# Patient Record
Sex: MALE | Race: BLACK OR AFRICAN AMERICAN | NOT HISPANIC OR LATINO | Employment: FULL TIME | URBAN - METROPOLITAN AREA
[De-identification: names, ages, dates, MRNs, and addresses within clinical notes are randomized per-mention and may not be internally consistent; named-entity substitution may affect disease eponyms.]

---

## 2022-07-26 ENCOUNTER — HOSPITAL ENCOUNTER (EMERGENCY)
Facility: HOSPITAL | Age: 57
Discharge: HOME | End: 2022-07-26
Attending: EMERGENCY MEDICINE | Admitting: EMERGENCY MEDICINE

## 2022-07-26 ENCOUNTER — APPOINTMENT (OUTPATIENT)
Dept: CT IMAGING | Facility: HOSPITAL | Age: 57
End: 2022-07-26

## 2022-07-26 VITALS
RESPIRATION RATE: 16 BRPM | TEMPERATURE: 97.8 F | OXYGEN SATURATION: 92 % | SYSTOLIC BLOOD PRESSURE: 128 MMHG | DIASTOLIC BLOOD PRESSURE: 84 MMHG | HEART RATE: 66 BPM

## 2022-07-26 DIAGNOSIS — M54.50 LUMBAR PAIN: Primary | ICD-10-CM

## 2022-07-26 PROCEDURE — 72131 CT LUMBAR SPINE W/O DYE: CPT

## 2022-07-26 PROCEDURE — 99283 EMERGENCY DEPT VISIT LOW MDM: CPT

## 2022-07-26 PROCEDURE — 74176 CT ABD & PELVIS W/O CONTRAST: CPT

## 2022-07-26 RX ORDER — ACETAMINOPHEN 500 MG
1000 TABLET ORAL ONCE
Status: COMPLETED | OUTPATIENT
Start: 2022-07-26 | End: 2022-07-26

## 2022-07-26 RX ORDER — IBUPROFEN 400 MG/1
400 TABLET ORAL ONCE
Status: COMPLETED | OUTPATIENT
Start: 2022-07-26 | End: 2022-07-26

## 2022-07-26 RX ADMIN — ACETAMINOPHEN 1000 MG: 500 TABLET ORAL at 10:51

## 2022-07-26 RX ADMIN — IBUPROFEN 400 MG: 400 TABLET, FILM COATED ORAL at 10:49

## 2022-07-26 NOTE — DISCHARGE INSTRUCTIONS
As we discussed, return if you have any fever, weakness in lower extremities, any urinary or fecal incontinence or retention, any numbness or tingling in the groin region or between your buttocks.  Call the numbers provided to establish a primary care provider and a spine doctor.  Take Tylenol or Motrin for pain.

## 2022-07-26 NOTE — ED PROVIDER NOTES
EMERGENCY DEPARTMENT ENCOUNTER    Room Number:  35/35  Date of encounter:  7/26/2022  PCP: Provider, No Known  Historian: Patient      HPI:  Chief Complaints back pain  A complete HPI/ROS/PMH/PSH/SH/FH are unobtainable due to: Patient speaks Panamanian and history was obtained via   Context: Antonio Wong is a 56 y.o. male who presents to the ED c/o back pain that is been occurring over 3 years.  Pain is in his lower back in his lumbar region.  Pain occurs more in the morning and is worse when he goes to get up out of bed and tries to initially start to walk.  Still is present episodically throughout the day but is better.  It is worse when he rolls over to his left or right side or when he goes to sit up.  He is seen a doctor at [facility] who is giving him oral medicine for pain that is not a narcotic.  This was 1 or 2 years ago.  He is no longer seeing that doctor.  He denies any urinary or fecal incontinence or retention, fever, history of malignancy, any weakness to his lower extremity.  Denies any numbness or tingling or any saddle anesthesia.  He currently does not have a primary care doctor and has never seen a specialist for this.  He also has no radiation of his symptoms to the leg.  He denies any abdominal pain, chest pain, shortness of breath.  A friend told him to come to the emergency department that we would take care of his back pain here in the emergency department.        Previous Episodes: Yes for over 3 years  Current Symptoms: Currently doing better than what he was in the morning.    MEDICAL HISTORY REVIEWED  I see patient had x-rays at [facility] in 2019 of the lumbar spine no fracture no gross significant abnormality.  The radiology report was reviewed.      PAST MEDICAL HISTORY  Active Ambulatory Problems     Diagnosis Date Noted   • No Active Ambulatory Problems     Resolved Ambulatory Problems     Diagnosis Date Noted   • No Resolved Ambulatory Problems     No Additional Past  Medical History         PAST SURGICAL HISTORY  No past surgical history on file.      FAMILY HISTORY  No family history on file.      SOCIAL HISTORY  Social History     Socioeconomic History   • Marital status:          ALLERGIES  Patient has no known allergies.        REVIEW OF SYSTEMS  Review of Systems     All systems reviewed and negative except for those discussed in HPI.       PHYSICAL EXAM    I have reviewed the triage vital signs and nursing notes.    ED Triage Vitals [07/26/22 0832]   Temp Heart Rate Resp BP SpO2   97.8 °F (36.6 °C) 90 16 -- 92 %      Temp src Heart Rate Source Patient Position BP Location FiO2 (%)   Tympanic Monitor -- -- --       GENERAL: Male no acute distress.Vital signs on my initial evaluation unremarkable.  He is resting comfortably.  O2 sat on my evaluation is 98% on room air.  HENT: nares patent  Head/neck/ face are symmetric without gross deformity, signs of trauma, or swelling  EYES: no scleral icterus, no conjunctival pallor.  NECK: Supple, no meningismus  CV: regular rhythm, regular rate with intact distal pulses.  RESPIRATORY: normal effort and no respiratory distress.  ABDOMEN: soft and nontender.  Scar on the right lower quadrant from previous appendectomy.  Back exam his location of pain is in his back diffusely.  Normal inspection.  He has normal saddle sensation and normal rectal tone.  His pain is reproducible when I have him rollover or have him sit up.  He is able to get up and stand and ambulate.  His pain is reproducible when he goes from a lying to a sitting and sitting to standing position.  MUSCULOSKELETAL: no deformity.5/5 strength to hip flexion, knee extension/flexion, dorsiflexion, plantarflexion, and EHL.  No pain with bilateral external and internal rotation of hips.  Intact distal pulses with no cyanosis, pallor, or temperature change on palpation. Normal inspection and palpation with soft compartments.  SILT at bilateral superficial peroneal, deep  peroneal, sural, and saphenous nerves  Negative straight leg bilaterally  NEURO: alert and appropriate, moves all extremities, follows commands.  No focal motor or sensory changes  SKIN: warm, dry    Vital signs and nursing notes reviewed.        LAB RESULTS  No results found for this or any previous visit (from the past 24 hour(s)).    Ordered the above labs and independently reviewed the results.        RADIOLOGY  CT Abdomen Pelvis Without Contrast    Result Date: 7/26/2022  CT ABDOMEN AND PELVIS WITHOUT IV CONTRAST  HISTORY: 56-year-old with back pain x2-3 years. No known injury.  TECHNIQUE: Radiation dose reduction techniques were utilized, including automated exposure control and exposure modulation based on body size. 3 mm images were obtained through the abdomen and pelvis without the administration of IV contrast. Lack of IV contrast limits evaluation of solid, visceral, and vascular structures. Sensitivity for underlying lesions and infection decreased.  COMPARISON: None.  FINDINGS: Evaluation is limited by significant motion.  LOWER CHEST: Dependent bibasilar atelectasis. The heart size is within normal limits.  ABDOMEN: Liver/Biliary Tract: Within normal limits.  Spleen: Within normal limits.  Pancreas: Within normal limits.  Adrenals: Within normal limits.  Kidneys:  No radiopaque obstructing stones or hydronephrosis.  Bowel:  Small hiatal hernia with thickening of the distal esophagus. The stomach is incompletely distended. No obstruction. The appendix is not definitively visualized with extensive motion artifact particularly in the right lower quadrant. No definitive acute inflammatory changes.  Peritoneum: No free fluid or free air.  Vasculature:    Scattered calcific atherosclerosis.  Lymph Nodes:  Within normal limits.                             PELVIS:                                 Pelvic organs: Diffuse thickening of the urinary bladder, query mild perivesical stranding versus motion. Prostate  in situ. No free fluid.  Abdominal/Pelvic Wall: Within normal limits.  BONES: Multilevel degenerative changes with probable Schmorl's node L2 vertebral body. Please refer to the separate dictated report of the lumbar spine for further detail.      1. No definitive acute intra-abdominal or pelvic process on this exam significantly limited by motion. The appendix is not definitively visualized, however there are no acute inflammatory changes in the right lower quadrant. If there is persistent clinical concern, recommend repeat motion-free imaging. 2. Diffuse thickening of the urinary bladder, please correlate for cystitis. 3. Degenerative changes lumbosacral spine and pelvis. Please refer to the separate dictated report of the spine for further detail. 4. Please see above for additional findings.      CT Lumbar Spine Without Contrast    Result Date: 7/26/2022  CT LUMBAR SPINE WITHOUT CONTRAST  HISTORY: Back pain.  COMPARISON: None.  FINDINGS: The alignment of the lumbar spine is within normal limits. A moderate-to-large Schmorl's node involving the superior endplate of L2 is appreciated measuring approximately 11 mm in size. Smaller Schmorl's nodes involving the inferior lateral aspect of L5 to the left and the superior endplate of L4 are noted.  L1-L2: There is no evidence of disc bulge or herniation.  L2-L3: There is no evidence of disc bulge or herniation.  L3-L4: A mild central disc osteophyte complex is present which results in mild flattening of the ventral surface of the thecal sac. It contributes to mild foraminal stenosis bilaterally.  L4-L5: A broad-based disc osteophyte complex is present which is more prominent to the left. This results in mild-to-moderate central canal stenosis, mild lateral recess narrowing on the right and moderate lateral recess narrowing on the left. Moderate foraminal stenosis is present bilaterally, slightly more prominent on the left secondary to extension of a disc osteophyte  complex into the neural foramen.  L5-S1: A broad-based disc osteophyte complex is present which is slightly more prominent to the left. This abuts and mildly displaces the left traversing S1 nerve root. Mild foraminal stenosis is present bilaterally secondary to extension of a disc osteophyte complex into the neural foramen.      Multilevel degenerative disease involving the lumbar spine as described above including multiple Schmorl's nodes (largest involving the superior endplate of L2 where it measures approximately 10-11 mm in size), broad-based disc osteophyte complexes and multilevel foraminal stenosis. Foraminal stenosis is most prominent at L4-L5 where it is estimated to be moderate due to extension of a disc osteophyte complex into the neural foramen. The disc osteophyte complex at L4-L5 is more prominent to the left and results in mild-to-moderate canal stenosis, mild lateral recess narrowing on the right and moderate lateral recess narrowing on the left. A smaller disc osteophyte complex is present at L5-S1, also more prominent to the left where it abuts and mildly displaces the left S1 nerve root. Clinical correlation is recommended. Further evaluation could be performed with an MRI examination of the lumbar spine.    Radiation dose reduction techniques were utilized, including automated exposure control and exposure modulation based on body size.         I ordered the above noted radiological studies. Reviewed by me and discussed with radiologist.  See dictation for official radiology interpretation.      PROCEDURES    Procedures      MEDICATIONS GIVEN IN ER    Medications   acetaminophen (TYLENOL) tablet 1,000 mg (1,000 mg Oral Given 7/26/22 1051)   ibuprofen (ADVIL,MOTRIN) tablet 400 mg (400 mg Oral Given 7/26/22 1049)         PROGRESS, DATA ANALYSIS, CONSULTS, AND MEDICAL DECISION MAKING    I informed patient of the test that we will order.  I will CT his abdomen and pelvis.  He does not want anything  for pain at this time.  Answered all questions at this time.  His history and exam are not concerning for any emergent cause of his back pain.  No signs of infection or cauda equina syndrome or any infection.  We are currently under a pandemic from the COVID19 infection.  The patient presented to the emergency department by ambulance or personal vehicle. I followed the current protocols required by Infection Control at [facility] in my evaluation and treatment of the patient. The patient was wearing a face mask during my evaluation and throughout my encounter. During my whole encounter with this patient I used appropriate personal protective equipment.  This equipment consisted of eye protection, facemask, gown, and gloves.  I applied this equipment before entering the room.      All labs have been independently reviewed by me.  All radiology studies have been reviewed by me and discussed with radiologist dictating the report.   EKG's independently viewed and interpreted by me.  Discussion below represents my analysis of pertinent findings related to patient's condition, differential diagnosis, treatment plan and final disposition.      ED Course as of 07/26/22 1552   Tue Jul 26, 2022   0841 I was informed by the triage nurse that this patient speaks Salvadorean.  She obtained history via  and she reports that this patient has had back pain for over 3 years. [MM]   1214 I have tried to use  iPad.  I requested a Salvadorean  and they are not able to access 1 at this time.  Instructed the nurse and she has been try to get access to a Salvadorean . [MM]   1129 CT scan of the abdomen pelvis report was reviewed no definitive acute intra-abdominal or pelvic process identified.  Patient is status post appendectomy.  Some thickening of the urinary bladder.  I reviewed the radiologist report.  Please see complete data report from radiologist. [MM]   1129 In reviewing the CT scan of  the lumbar spine he has multilevel degenerative disease involving the lumbar spine please see the dictated report from radiologist I reviewed the report nothing that is emergent only concerning on the CT scan report. [MM]   1230 In attempting the history from the .  He has no warning signs of any serious cause of back pain.  This is been occurring for several years.  It is worse with movement and especially worse in the mornings.  He has no urinary or fecal incontinence or retention, fever, history of active cancer, weakness to his lower extremity, has no paresthesias or numbness including no saddle anesthesia.  His pain does not radiate to his lower extremities.  On my exam he is able to stand and ambulate.  Appears to have some mild discomfort when he goes from a lying position to a sitting and standing position.  I conveyed to him on my initial encounter with him my concerns that this is likely musculoskeletal in origin and the test that we will order.  I also explained to him that I will give him a spine doctor and a primary care doctor to follow-up with, assuming the CT scans do not show any emergent condition.  I would be surprised if the CT scan of the abdomen pelvis as well as a CT scan of the lumbar spine would show anything that is emergent that we would need to act on immediately in the emergency department. [MM]   1241 I communicated via  with the patient and informed the results of the CT scans.  And I did clarify that this is not an emergent condition and gave him warning signs to return.  I also will give him a primary care provider number to call as well as a spine doctor.  All questions were answered. [MM]      ED Course User Index  [MM] Tello Villafuerte MD       AS OF 15:52 EDT VITALS:    BP - 128/84  HR - 66  TEMP - 97.8 °F (36.6 °C) (Tympanic)  02 SATS - 92%        DIAGNOSIS  Final diagnoses:   Lumbar pain: Musculoskeletal lumbar pain         DISPOSITION  DISCHARGE    Patient  discharged in stable condition.    Reviewed implications of results, diagnosis, meds, responsibility to follow up, warning signs and symptoms of possible worsening, potential complications and reasons to return to ER, including worsening of symptoms, worsening of pain, fever, urinary or fecal incontinence or retention, any weakness in lower extremities, or any concerns..    Patient/Family voiced understanding of above instructions.    Discussed plan for discharge, as there is no emergent indication for admission. Pt/family is agreeable and understands need for follow up and repeat testing.  Pt is aware that discharge does not mean that nothing is wrong but it indicates no emergency is present that requires admission and they must continue care with follow-up as given below or physician of their choice.     FOLLOW-UP  PATIENT CONNECTION - [place] [address]  [phone]    Call today when you get home or tomorrow to arrange follow-up with a new primary care provider.  Return if any weakness in lower extremities, urinary or fecal incontinence or retention, any numbness or tingling in your bottom or groin, fever, any concern    Michael Casarez MD  [address] [place] [address]  [phone]      Call today or tomorrow to arrange follow-up with spine doctor.  Return if any weakness, urinary or fecal incontinence, fever, worsening of pain, or any concerns         Medication List      No changes were made to your prescriptions during this visit.                 DICTATED UTILIZING [employer] DICTATION     Tello Villafuerte MD  07/26/22 7403

## 2022-07-26 NOTE — ED TRIAGE NOTES
Pt complains of back pain for the last 2-3 years. Pt states that when he wakes up in the mornings he has more pain. States that when he uses OTC medications the pain gets better. No specific injury. The pt speaks Syriac and  used.    Patient masked at arrival and triage staff wore all appropriate PPE during entire encounter with patient.

## 2022-07-28 NOTE — PROGRESS NOTES
"Subjective   Patient ID: Antonio Wong is a 56 y.o. male is being seen for consultation today at the request of Tlelo Villafuerte MD for low back pain.     Patient is Ukrainian speaking and  service utilized for this visit (Thuan,  #[id number])    Mr. Wong presented to the ER on 07/26/2022 with acute worsening of chronic back pain. He states that he has had ongoing back pain for 3 years but over the past week, the pain has worsened with no aggravating symptoms, injury, or trauma. He reports the location of the pain is from his lower lumbar/upper buttock to his neck. His pain is aggravated by activity and prolonged sitting or laying. His pain is improved with Aleve. He has had no formal treatment for his ongoing back pain.  He reports pain severity 9/10.  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  While in the ED, he underwent lumbar spine CT imaging which revealed he has recent CT of the lumbar spine and pelvis.  At this time, the patient does not have a primary care provider and has not seen a primary care provider in several years.  Patient denies any lower extremity pain, weakness, or numbness.  He does report a tingling sensation in the bottom of his right foot that feels like \"ants are crawling on him\", but denies any difficulty walking.  He denies any bowel or bladder incontinence or saddle anesthesia. He reports the pain is worse in the morning when he is trying to get ready for work. He also reports difficulty with getting and keeping an erection and reports having difficulty with premature ejaculation.     History of Present Illness    The following portions of the patient's history were reviewed and updated as appropriate: allergies, current medications, past family history, past medical history, past social history, past surgical history and problem list.    Review of Systems   Constitutional: Positive for activity change.   HENT: " Negative for congestion.    Eyes: Negative for visual disturbance.   Respiratory: Negative for chest tightness and shortness of breath.    Cardiovascular: Negative for chest pain.   Gastrointestinal: Negative for nausea and vomiting.   Endocrine: Negative for cold intolerance and heat intolerance.   Genitourinary: Negative for difficulty urinating.   Musculoskeletal: Positive for back pain.   Skin: Negative.  Negative for rash and wound.   Allergic/Immunologic: Positive for environmental allergies.   Neurological: Positive for weakness and numbness.   Hematological: Does not bruise/bleed easily.   Psychiatric/Behavioral: Positive for sleep disturbance.         Objective     Vitals:    07/29/22 0949   BP: 122/72   Pulse: 78   SpO2: 97%   Weight: 90.4 kg (199 lb 6.4 oz)     There is no height or weight on file to calculate BMI.      Physical Exam  Vitals reviewed.   Constitutional:       General: He is awake. He is not in acute distress.     Appearance: Normal appearance. He is not ill-appearing, toxic-appearing or diaphoretic.   HENT:      Head: Normocephalic and atraumatic.      Nose: Nose normal.   Eyes:      Extraocular Movements: Extraocular movements intact.   Pulmonary:      Effort: Pulmonary effort is normal.   Abdominal:      Palpations: Abdomen is soft.   Musculoskeletal:      Cervical back: Normal range of motion and neck supple. No bony tenderness.      Thoracic back: No bony tenderness.      Lumbar back: No bony tenderness. Negative right straight leg raise test and negative left straight leg raise test.      Comments: Patient had some upper lumbar spine/lower thoracic spine pain with torso flexion but no pain with torso extension.   Skin:     General: Skin is warm and dry.   Neurological:      General: No focal deficit present.      Mental Status: He is alert and oriented to person, place, and time.      GCS: GCS eye subscore is 4. GCS verbal subscore is 5. GCS motor subscore is 6.      Gait: Gait is  intact.      Deep Tendon Reflexes: Strength normal.      Reflex Scores:       Tricep reflexes are 1+ on the right side and 1+ on the left side.       Bicep reflexes are 1+ on the right side and 1+ on the left side.       Brachioradialis reflexes are 1+ on the right side and 1+ on the left side.       Patellar reflexes are 1+ on the right side and 1+ on the left side.       Achilles reflexes are 1+ on the right side and 1+ on the left side.  Psychiatric:         Mood and Affect: Mood normal.         Speech: Speech normal.         Behavior: Behavior normal. Behavior is cooperative.         Thought Content: Thought content normal.         Judgment: Judgment normal.       Neurologic Exam     Mental Status   Oriented to person, place, and time.   Attention: normal. Concentration: normal.   Speech: speech is normal   Level of consciousness: alert  Knowledge: good.   Normal comprehension.     Cranial Nerves   Cranial nerves II through XII intact.     Motor Exam   Muscle bulk: normal  Overall muscle tone: normal    Strength   Strength 5/5 throughout.   Negative Ning, [place], and compression testing. Patient was able to heel and toe walk, squat and stand without assistance     Sensory Exam   Light touch normal.     Gait, Coordination, and Reflexes     Gait  Gait: normal    Reflexes   Right brachioradialis: 1+  Left brachioradialis: 1+  Right biceps: 1+  Left biceps: 1+  Right triceps: 1+  Left triceps: 1+  Right patellar: 1+  Left patellar: 1+  Right achilles: 1+  Left achilles: 1+  Right : 1+  Left : 1+  Right Churchill: absent  Left Churchill: absent  Right ankle clonus: absent  Left ankle clonus: absent          Assessment & Plan   Independent Review of Radiographic Studies:      I personally reviewed the images from the following studies and had Dr. Suero review them as well:  LUMBAR SPINE CT W/O CONTRAST 7/26/22-  Multilevel degenerative disease involving the lumbar spine as described above including multiple  Schmorl's nodes (largest involving the superior endplate of L2 where it measures approximately 10-11 mm in size), broad-based disc osteophyte complexes and multilevel foraminal stenosis. Foraminal stenosis is most prominent at L4-L5 where it is estimated to be moderate due to extension of a disc osteophyte complex into the neural foramen. The disc osteophyte complex at L4-L5 is more prominent to the left and results in mild-to-moderate canal stenosis, mild lateral recess narrowing on the right and moderate lateral recess narrowing on the left. A smaller disc osteophyte complex is present at L5-S1, also more prominent to the left where it abuts and mildly displaces the left S1 nerve root.     Medical Decision Making:      Patient is Swedish speaking and  service utilized for this visit (Thuan,  #[id number])    Mr. Wong is here today to discuss acute worsening of chronic back pain after an ER visit on 7/26/22.  Lumbar spine CT imaging was done at this time which revealed multiple Schmorl's nodes with the largest involving the superior endplate of L2, however there appears to be bone formation around the Schmorl's node at this level, meaning that it is most likely chronic in nature.  CT imaging also showed some moderate foraminal stenosis at L4-5 due to disc osteophyte complex, which is more prominent to the left resulting in mild to moderate canal stenosis, mild lateral recess narrowing on the right, and moderate lateral recess narrowing on the left.  There is also a smaller disc osteophyte complex at L5-S1 that is more prominent to the left where it mildly displaces the left S1 nerve root.  The patient's exam was markedly negative for any neurological deficit or red flags.  The patient had 5/5 strength throughout, no clonus or hyperreflexia.  I discussed the CT imaging with the patient and told him that there was nothing of a surgical nature noted on the imaging.  Encouraged him to get  reestablished with a primary care provider who would be able to continue ongoing care and medication management.  At this time, I think the best thing would be to begin with some conservative management including physical therapy and anti-inflammatory medications along with some muscle relaxers and back rest.  As far as the patient's complaint of difficulty achieving an erection, I informed the patient that erectile dysfunction is not a side effect of his current back problems and that he would need to follow-up with a urologist for continued treatment recommendations for this. I told the patient to call if he were to develop worsening symptoms or new onset bowel or bladder incontinence. If the patient continues to have pain despite conservative management, could move forward with MRI imaging and perhaps pain management. The patient acknowledged understanding is in agreement with the above plan.       Diagnoses and all orders for this visit:    1. Chronic midline low back pain without sciatica (Primary)  -     Ambulatory Referral to Physical Therapy Evaluate and treat; Stretching, ROM, Strengthening; Full weight bearing    2. Erectile dysfunction, unspecified erectile dysfunction type  -     Ambulatory Referral to Urology    Other orders  -     naproxen (NAPROSYN) 250 MG tablet; Take 2 tablets by mouth 2 (Two) Times a Day As Needed for Mild Pain .  Dispense: 60 tablet; Refill: 0  -     methocarbamol (ROBAXIN) 750 MG tablet; Take 1 tablet by mouth 4 (Four) Times a Day As Needed for Muscle Spasms.  Dispense: 60 tablet; Refill: 0      Return if symptoms worsen or fail to improve.

## 2022-07-29 ENCOUNTER — OFFICE VISIT (OUTPATIENT)
Dept: NEUROSURGERY | Facility: CLINIC | Age: 57
End: 2022-07-29

## 2022-07-29 VITALS
OXYGEN SATURATION: 97 % | DIASTOLIC BLOOD PRESSURE: 72 MMHG | WEIGHT: 199.4 LBS | SYSTOLIC BLOOD PRESSURE: 122 MMHG | HEART RATE: 78 BPM

## 2022-07-29 DIAGNOSIS — M54.50 CHRONIC MIDLINE LOW BACK PAIN WITHOUT SCIATICA: Primary | ICD-10-CM

## 2022-07-29 DIAGNOSIS — G89.29 CHRONIC MIDLINE LOW BACK PAIN WITHOUT SCIATICA: Primary | ICD-10-CM

## 2022-07-29 DIAGNOSIS — N52.9 ERECTILE DYSFUNCTION, UNSPECIFIED ERECTILE DYSFUNCTION TYPE: ICD-10-CM

## 2022-07-29 PROCEDURE — 99204 OFFICE O/P NEW MOD 45 MIN: CPT | Performed by: NURSE PRACTITIONER

## 2022-07-29 RX ORDER — NAPROXEN SODIUM 220 MG
220 TABLET ORAL 2 TIMES DAILY PRN
COMMUNITY
End: 2022-07-29

## 2022-07-29 RX ORDER — NAPROXEN 250 MG/1
500 TABLET ORAL 2 TIMES DAILY PRN
Qty: 60 TABLET | Refills: 0 | Status: SHIPPED | OUTPATIENT
Start: 2022-07-29

## 2022-07-29 RX ORDER — METHOCARBAMOL 750 MG/1
750 TABLET, FILM COATED ORAL 4 TIMES DAILY PRN
Qty: 60 TABLET | Refills: 0 | Status: SHIPPED | OUTPATIENT
Start: 2022-07-29